# Patient Record
Sex: FEMALE | Race: WHITE | Employment: FULL TIME | URBAN - METROPOLITAN AREA
[De-identification: names, ages, dates, MRNs, and addresses within clinical notes are randomized per-mention and may not be internally consistent; named-entity substitution may affect disease eponyms.]

---

## 2017-07-10 PROBLEM — I10 ESSENTIAL HYPERTENSION: Status: ACTIVE | Noted: 2017-07-10

## 2017-12-19 ENCOUNTER — HOSPITAL ENCOUNTER (OUTPATIENT)
Dept: MAMMOGRAPHY | Age: 52
Discharge: HOME | End: 2017-12-19
Attending: OBSTETRICS & GYNECOLOGY
Payer: COMMERCIAL

## 2017-12-19 DIAGNOSIS — Z12.31 VISIT FOR SCREENING MAMMOGRAM: ICD-10-CM

## 2017-12-19 PROCEDURE — 77067 SCR MAMMO BI INCL CAD: CPT

## 2018-01-15 PROBLEM — F33.9 RECURRENT DEPRESSION (HCC): Status: ACTIVE | Noted: 2018-01-15

## 2018-07-11 PROBLEM — N95.2 ATROPHIC VAGINITIS: Status: ACTIVE | Noted: 2018-07-11

## 2018-07-30 PROBLEM — E66.01 OBESITY, MORBID (HCC): Status: ACTIVE | Noted: 2018-07-30

## 2018-12-20 ENCOUNTER — HOSPITAL ENCOUNTER (OUTPATIENT)
Dept: MAMMOGRAPHY | Age: 53
Discharge: HOME | End: 2018-12-20
Attending: OBSTETRICS & GYNECOLOGY
Payer: COMMERCIAL

## 2018-12-20 DIAGNOSIS — Z12.31 VISIT FOR SCREENING MAMMOGRAM: ICD-10-CM

## 2018-12-20 PROCEDURE — 77067 SCR MAMMO BI INCL CAD: CPT

## 2019-12-23 ENCOUNTER — HOSPITAL ENCOUNTER (OUTPATIENT)
Dept: MAMMOGRAPHY | Age: 54
Discharge: HOME | End: 2019-12-23
Attending: OBSTETRICS & GYNECOLOGY
Payer: COMMERCIAL

## 2019-12-23 DIAGNOSIS — Z12.31 VISIT FOR SCREENING MAMMOGRAM: ICD-10-CM

## 2019-12-23 PROCEDURE — 77063 BREAST TOMOSYNTHESIS BI: CPT

## 2020-10-07 ENCOUNTER — TRANSCRIBE ORDER (OUTPATIENT)
Dept: SCHEDULING | Age: 55
End: 2020-10-07

## 2020-10-07 DIAGNOSIS — Z12.31 SCREENING MAMMOGRAM FOR HIGH-RISK PATIENT: Primary | ICD-10-CM

## 2020-12-29 ENCOUNTER — HOSPITAL ENCOUNTER (OUTPATIENT)
Dept: MAMMOGRAPHY | Age: 55
Discharge: HOME | End: 2020-12-29
Attending: OBSTETRICS & GYNECOLOGY
Payer: COMMERCIAL

## 2020-12-29 DIAGNOSIS — Z12.31 SCREENING MAMMOGRAM FOR HIGH-RISK PATIENT: ICD-10-CM

## 2020-12-29 PROCEDURE — 77063 BREAST TOMOSYNTHESIS BI: CPT

## 2021-08-03 PROBLEM — I10 HYPERTENSION: Status: RESOLVED | Noted: 2021-08-03 | Resolved: 2021-08-03

## 2022-01-11 ENCOUNTER — HOSPITAL ENCOUNTER (OUTPATIENT)
Dept: MAMMOGRAPHY | Age: 57
Discharge: HOME | End: 2022-01-11
Attending: OBSTETRICS & GYNECOLOGY
Payer: COMMERCIAL

## 2022-01-11 ENCOUNTER — HOSPITAL ENCOUNTER (OUTPATIENT)
Dept: MAMMOGRAPHY | Age: 57
Discharge: HOME | End: 2022-01-11
Attending: FAMILY MEDICINE
Payer: COMMERCIAL

## 2022-01-11 DIAGNOSIS — E28.39 ESTROGEN DEFICIENCY: ICD-10-CM

## 2022-01-11 DIAGNOSIS — Z12.31 SCREENING MAMMOGRAM, ENCOUNTER FOR: ICD-10-CM

## 2022-01-11 PROCEDURE — 77080 DXA BONE DENSITY AXIAL: CPT

## 2022-01-11 PROCEDURE — 77063 BREAST TOMOSYNTHESIS BI: CPT

## 2022-03-19 PROBLEM — F33.9 RECURRENT DEPRESSION (HCC): Status: ACTIVE | Noted: 2018-01-15

## 2022-03-19 PROBLEM — N95.2 ATROPHIC VAGINITIS: Status: ACTIVE | Noted: 2018-07-11

## 2022-03-19 PROBLEM — E66.01 OBESITY, MORBID (HCC): Status: ACTIVE | Noted: 2018-07-30

## 2022-03-20 PROBLEM — I10 ESSENTIAL HYPERTENSION: Status: ACTIVE | Noted: 2017-07-10

## 2022-06-07 ENCOUNTER — OFFICE VISIT (OUTPATIENT)
Dept: GYNECOLOGY | Age: 57
End: 2022-06-07
Payer: COMMERCIAL

## 2022-06-07 VITALS
HEIGHT: 63 IN | WEIGHT: 230 LBS | BODY MASS INDEX: 40.75 KG/M2 | DIASTOLIC BLOOD PRESSURE: 98 MMHG | SYSTOLIC BLOOD PRESSURE: 150 MMHG

## 2022-06-07 DIAGNOSIS — R53.83 OTHER FATIGUE: Primary | ICD-10-CM

## 2022-06-07 DIAGNOSIS — F41.8 ANXIETY WITH DEPRESSION: ICD-10-CM

## 2022-06-07 PROCEDURE — 99214 OFFICE O/P EST MOD 30 MIN: CPT | Performed by: OBSTETRICS & GYNECOLOGY

## 2022-06-07 NOTE — PROGRESS NOTES
MEREDITH Mccloud is a 61 y.o. female seen for fatigue. She is not having any hot flashes or night sweats. She is having some mild issues with libido. Today the patient states that she has been experiencing increasing anxiety with depression she lost her father-in-law earlier this year and her best friend recently. She has loss of interest in activities and not really interested in leaving the house the weekends like she usually does. She been taking 20 of Prozac. She states that she is taking 2 tablets at a time before but it does not seem to help. I will change her Zoloft 50 mg a day and after 2 weeks she can increase this to 1-1/2 to 2 tablets a day with plans to follow her up in 3 weeks. Past Medical History, Past Surgical History, Family history, Social History, and Medications were all reviewed with the patient today and updated as necessary. Current Outpatient Medications   Medication Sig    sertraline (ZOLOFT) 50 MG tablet Take 1 tablet by mouth daily    Multiple Vitamin (MULTIVITAMIN PO) Take by mouth    conjugated estrogens (PREMARIN) 0.625 MG/GM vaginal cream Place 0.5 g vaginally Twice a Week    lisinopril (PRINIVIL;ZESTRIL) 10 MG tablet TAKE 1 TABLET BY MOUTH EVERY DAY    meloxicam (MOBIC) 7.5 MG tablet TAKE 1 TABLET BY MOUTH EVERY DAY    oxybutynin (DITROPAN-XL) 10 MG extended release tablet Take 10 mg by mouth daily     No current facility-administered medications for this visit.      Allergies   Allergen Reactions    Latex Rash    Adhesive Tape Hives    Other Hives    Sulfa Antibiotics Hives     Past Medical History:   Diagnosis Date    SUNNY III (cervical intraepithelial neoplasia grade III) with severe dysplasia     Depression     Hyperlipidemia     Hypertension     Overactive bladder     Swelling of left lower extremity      Past Surgical History:   Procedure Laterality Date    CHOLECYSTECTOMY      COLONOSCOPY      due 2022    LEE  1993     Family History Problem Relation Age of Onset    Colon Cancer Neg Hx     Thyroid Cancer Maternal Grandmother     Breast Cancer Maternal Aunt 36        Great Aunt    Heart Disease Mother         leaky valve    Ovarian Cancer Neg Hx     Hypertension Mother       Social History     Tobacco Use    Smoking status: Never Smoker    Smokeless tobacco: Never Used   Substance Use Topics    Alcohol use: Yes       Social History     Substance and Sexual Activity   Sexual Activity Not Currently    Partners: Male     OB History    Para Term  AB Living   0 0 0 0 0 0   SAB IAB Ectopic Molar Multiple Live Births   0 0 0 0 0 0       Health Maintenance  Mammogram: 2022  Colonoscopy:   Bone Density:    ROS:    Review of Systems  General: Not Present- Chills, Fever, Fatigue, Insomnia, Hot flashes/Night sweats, Weight gain  Skin: Not Present- Bruising, Change in Wart/Mole, Excessive Sweating, Itching, Nail Changes, New Lesions, Rash, Skin Color Changes and Ulcer. HEENT: Not Present- Headache, Blurred Vision, Double Vision, Glaucoma, Visual Disturbances, Hearing Loss, Ringing in the Ears, Vertigo, Nose Bleed, Bleeding Gums, Hoarseness and Sore Throat. Neck: Not Present- Neck Pain and Neck Swelling. Respiratory: Not Present- Cough, Difficulty Breathing and Difficulty Breathing on Exertion. Breast: Not Present- Breast Mass, Breast Pain, Breast Swelling, Nipple Discharge, Nipple Pain, Recent Breast Size Changes and Skin Changes. Cardiovascular: Not Present- Abnormal Blood Pressure, Chest Pain, Edema, Fainting / Blacking Out, Palpitations, Shortness of Breath and Swelling of Extremities. Gastrointestinal: Not Present- Abdominal Pain, Abdominal Swelling, Bloating, Change in Bowel Habits, Constipation, Diarrhea, Difficulty Swallowing, Gets full quickly at meals, Nausea, Rectal Bleeding and Vomiting.   Female Genitourinary: Not Present- Dysmenorrhea, Dyspareunia, Decreased libido, Excessive Menstrual Bleeding, Menstrual Irregularities, Pelvic Pain, Urinary Complaints, Vaginal Discharge, Vaginal itching/burning, Vaginal odor  Musculoskeletal: Not Present- Joint Pain and Muscle Pain. Neurological: Not Present- Dizziness, Fainting, Headaches and Seizures. Psychiatric: Not Present- Anxiety, Depression, Mood changes and Panic Attacks. Endocrine: Not Present- Appetite Changes, Cold Intolerance, Excessive Thirst, Excessive Urination and Heat Intolerance. Hematology: Not Present- Abnormal Bleeding, Easy Bruising and Enlarged Lymph Nodes. PHYSICAL EXAM:    BP (!) 150/98 (Position: Sitting)   Ht 5' 3\" (1.6 m)   Wt 230 lb (104.3 kg)   BMI 40.74 kg/m²           Medical problems and test results were reviewed with the patient today. ASSESSMENT and Yayo Lea was seen today for menopause. Diagnoses and all orders for this visit:    Other fatigue    Anxiety with depression  -     sertraline (ZOLOFT) 50 MG tablet; Take 1 tablet by mouth daily              Time:  I spent  30 minutes in preparing to see patient (including chart review and preparation), obtaining and/or reviewing additional medical history, performing a physical exam and evaluation, documenting clinical information in the electronic health record, independently interpreting results, communicating results to patient, family or caregiver, and/or coordinating care. No follow-up provider specified.         Aminata Roldan MD

## 2022-06-28 ENCOUNTER — OFFICE VISIT (OUTPATIENT)
Dept: GYNECOLOGY | Age: 57
End: 2022-06-28
Payer: COMMERCIAL

## 2022-06-28 VITALS
HEIGHT: 63 IN | SYSTOLIC BLOOD PRESSURE: 120 MMHG | BODY MASS INDEX: 40.93 KG/M2 | WEIGHT: 231 LBS | DIASTOLIC BLOOD PRESSURE: 80 MMHG

## 2022-06-28 DIAGNOSIS — R53.83 OTHER FATIGUE: ICD-10-CM

## 2022-06-28 DIAGNOSIS — F41.8 ANXIETY WITH DEPRESSION: Primary | ICD-10-CM

## 2022-06-28 PROCEDURE — 99213 OFFICE O/P EST LOW 20 MIN: CPT | Performed by: OBSTETRICS & GYNECOLOGY

## 2022-06-28 NOTE — PROGRESS NOTES
MEREDITH Bradford is a 61 y.o. female seen for follow up anxiety/depression. Pt was started on Zoloft 50 mg at last visit and her Prozac was discontinued. Pt state she does feel better on the Zoloft and is currently still taking just 50 mg. Past Medical History, Past Surgical History, Family history, Social History, and Medications were all reviewed with the patient today and updated as necessary. Current Outpatient Medications   Medication Sig    sertraline (ZOLOFT) 50 MG tablet Take 1 tablet by mouth daily    Multiple Vitamin (MULTIVITAMIN PO) Take by mouth    conjugated estrogens (PREMARIN) 0.625 MG/GM vaginal cream Place 0.5 g vaginally Twice a Week    lisinopril (PRINIVIL;ZESTRIL) 10 MG tablet TAKE 1 TABLET BY MOUTH EVERY DAY    meloxicam (MOBIC) 7.5 MG tablet TAKE 1 TABLET BY MOUTH EVERY DAY    oxybutynin (DITROPAN-XL) 10 MG extended release tablet Take 10 mg by mouth daily     No current facility-administered medications for this visit.      Allergies   Allergen Reactions    Latex Rash    Adhesive Tape Hives    Other Hives    Sulfa Antibiotics Hives     Past Medical History:   Diagnosis Date    SUNNY III (cervical intraepithelial neoplasia grade III) with severe dysplasia     Depression     Hyperlipidemia     Hypertension     Overactive bladder     Swelling of left lower extremity      Past Surgical History:   Procedure Laterality Date    CHOLECYSTECTOMY      COLONOSCOPY      due 2022    [facility]  1993     Family History   Problem Relation Age of Onset    Colon Cancer Neg Hx     Thyroid Cancer Maternal Grandmother     Breast Cancer Maternal Aunt 36        Great Aunt    Heart Disease Mother         leaky valve    Ovarian Cancer Neg Hx     Hypertension Mother       Social History     Tobacco Use    Smoking status: Never Smoker    Smokeless tobacco: Never Used   Substance Use Topics    Alcohol use: Yes     Comment: occ       Social History     Substance and Sexual Activity Sexual Activity Not Currently    Partners: Male     OB History    Para Term  AB Living   0 0 0 0 0 0   SAB IAB Ectopic Molar Multiple Live Births   0 0 0 0 0 0       Health Maintenance  Mammogram:   Colonoscopy:   Bone Density:    ROS:    Review of Systems  General: Not Present- Chills, Fever, Fatigue, Insomnia, Hot flashes/Night sweats, Weight gain  Skin: Not Present- Bruising, Change in Wart/Mole, Excessive Sweating, Itching, Nail Changes, New Lesions, Rash, Skin Color Changes and Ulcer. HEENT: Not Present- Headache, Blurred Vision, Double Vision, Glaucoma, Visual Disturbances, Hearing Loss, Ringing in the Ears, Vertigo, Nose Bleed, Bleeding Gums, Hoarseness and Sore Throat. Neck: Not Present- Neck Pain and Neck Swelling. Respiratory: Not Present- Cough, Difficulty Breathing and Difficulty Breathing on Exertion. Breast: Not Present- Breast Mass, Breast Pain, Breast Swelling, Nipple Discharge, Nipple Pain, Recent Breast Size Changes and Skin Changes. Cardiovascular: Not Present- Abnormal Blood Pressure, Chest Pain, Edema, Fainting / Blacking Out, Palpitations, Shortness of Breath and Swelling of Extremities. Gastrointestinal: Not Present- Abdominal Pain, Abdominal Swelling, Bloating, Change in Bowel Habits, Constipation, Diarrhea, Difficulty Swallowing, Gets full quickly at meals, Nausea, Rectal Bleeding and Vomiting. Female Genitourinary: Not Present- Dysmenorrhea, Dyspareunia, Decreased libido, Excessive Menstrual Bleeding, Menstrual Irregularities, Pelvic Pain, Urinary Complaints, Vaginal Discharge, Vaginal itching/burning, Vaginal odor  Musculoskeletal: Not Present- Joint Pain and Muscle Pain. Neurological: Not Present- Dizziness, Fainting, Headaches and Seizures. Psychiatric: Not Present- Anxiety, Depression, Mood changes and Panic Attacks. Endocrine: Not Present- Appetite Changes, Cold Intolerance, Excessive Thirst, Excessive Urination and Heat Intolerance.   Hematology: Not Present- Abnormal Bleeding, Easy Bruising and Enlarged Lymph Nodes. PHYSICAL EXAM:    /80   Ht 5' 3\" (1.6 m)   Wt 231 lb (104.8 kg)   BMI 40.92 kg/m²           Medical problems and test results were reviewed with the patient today. ASSESSMENT and PLAN    1. Anxiety with depression  2. Other fatigue             Time:  I spent  20 minutes in preparing to see patient (including chart review and preparation), obtaining and/or reviewing additional medical history, performing a physical exam and evaluation, documenting clinical information in the electronic health record, independently interpreting results, communicating results to patient, family or caregiver, and/or coordinating care. No follow-ups on file.        Jose Antonio Carter MD

## 2022-08-15 ENCOUNTER — OFFICE VISIT (OUTPATIENT)
Dept: GYNECOLOGY | Age: 57
End: 2022-08-15
Payer: COMMERCIAL

## 2022-08-15 VITALS
SYSTOLIC BLOOD PRESSURE: 124 MMHG | DIASTOLIC BLOOD PRESSURE: 84 MMHG | BODY MASS INDEX: 39.51 KG/M2 | HEIGHT: 63 IN | WEIGHT: 223 LBS

## 2022-08-15 DIAGNOSIS — Z12.31 SCREENING MAMMOGRAM, ENCOUNTER FOR: ICD-10-CM

## 2022-08-15 DIAGNOSIS — Z12.4 CERVICAL CANCER SCREENING: ICD-10-CM

## 2022-08-15 DIAGNOSIS — F41.8 ANXIETY WITH DEPRESSION: ICD-10-CM

## 2022-08-15 DIAGNOSIS — Z01.419 ENCOUNTER FOR WELL WOMAN EXAM WITH ROUTINE GYNECOLOGICAL EXAM: Primary | ICD-10-CM

## 2022-08-15 DIAGNOSIS — N95.2 VAGINAL ATROPHY: ICD-10-CM

## 2022-08-15 PROCEDURE — 99396 PREV VISIT EST AGE 40-64: CPT | Performed by: OBSTETRICS & GYNECOLOGY

## 2022-08-15 RX ORDER — CONJUGATED ESTROGENS 0.62 MG/G
0.5 CREAM VAGINAL
Qty: 30 G | Refills: 5 | Status: SHIPPED | OUTPATIENT
Start: 2022-08-15

## 2022-08-15 NOTE — PROGRESS NOTES
MEREDITH Billy is a 61 y.o. female seen for annual GYN exam.  Her anxiety is much better after changing to Zoloft and she wishes to continue this. Past Medical History, Past Surgical History, Family history, Social History, and Medications were all reviewed with the patient today and updated as necessary. Current Outpatient Medications   Medication Sig    sertraline (ZOLOFT) 50 MG tablet Take 1 tablet by mouth in the morning. conjugated estrogens (PREMARIN) 0.625 MG/GM vaginal cream Place 0.5 g vaginally Twice a Week    Multiple Vitamin (MULTIVITAMIN PO) Take by mouth    lisinopril (PRINIVIL;ZESTRIL) 10 MG tablet TAKE 1 TABLET BY MOUTH EVERY DAY    meloxicam (MOBIC) 7.5 MG tablet TAKE 1 TABLET BY MOUTH EVERY DAY    oxybutynin (DITROPAN-XL) 10 MG extended release tablet Take 10 mg by mouth daily     No current facility-administered medications for this visit.      Allergies   Allergen Reactions    Latex Rash    Adhesive Tape Hives    Other Hives    Sulfa Antibiotics Hives     Past Medical History:   Diagnosis Date    SUNNY III (cervical intraepithelial neoplasia grade III) with severe dysplasia     Depression     Hyperlipidemia     Hypertension     Overactive bladder     Swelling of left lower extremity      Past Surgical History:   Procedure Laterality Date    CHOLECYSTECTOMY      COLONOSCOPY      due     [facility]       Family History   Problem Relation Age of Onset    Colon Cancer Neg Hx     Thyroid Cancer Maternal Grandmother     Breast Cancer Maternal Aunt 36        Great Aunt    Heart Disease Mother         leaky valve    Ovarian Cancer Neg Hx     Hypertension Mother       Social History     Tobacco Use    Smoking status: Never    Smokeless tobacco: Never   Substance Use Topics    Alcohol use: Yes     Comment: occ       Social History     Substance and Sexual Activity   Sexual Activity Not Currently    Partners: Male     OB History    Para Term  AB Living   0 0 0 0 0 0   SAB 124/84 (Position: Sitting)   Ht 5' 3\" (1.6 m)   Wt 223 lb (101.2 kg)   BMI 39.50 kg/m²     Physical Exam   General   Mental Status - Alert. General Appearance - Cooperative. Integumentary   General Characteristics: Overall examination of the patient's skin reveals - no rashes and no suspicious lesions. Head and Neck  Head - normocephalic, atraumatic with no lesions or palpable masses. Neck Note: Normal   Thyroid   Gland Characteristics - normal size and consistency and no palpable nodules. Chest and Lung Exam   Chest and lung exam reveals - on auscultation, normal breath sounds, no adventitious sounds and normal vocal resonance. Breast   Breast - Left - Normal. Right - Normal.     Cardiovascular   Cardiovascular examination reveals - normal heart sounds, regular rate and rhythm with no murmurs. Abdomen   Inspection: - Inspection Normal.   Palpation/Percussion: Palpation and Percussion of the abdomen reveal - Non Tender, No Rebound tenderness, No Rigidity (guarding), No hepatosplenomegaly, No Palpable abdominal masses and Soft. Auscultation: Auscultation of the abdomen reveals - Bowel sounds normal.     Female Genitourinary     External Genitalia   Vulva: - Normal. Perineum - Normal. Bartholin's Gland - Bilateral - Normal. Clitoris - Normal.   Introitus: Characteristics - Normal.   Urethra: Characteristics - Normal.     Speculum & Bimanual   Vagina: Vaginal Mucosa - Normal.   Vaginal Wall: - Normal.   Vaginal Lesions - None. Cervix: Characteristics - Normal.   Uterus: Characteristics - Normal.   Adnexa: - Normal.   Bladder - Normal.     Peripheral Vascular   Normal    Neuropsychiatric   Examination of related systems reveals - The patient is well-nourished and well-groomed. Mental status exam performed with findings of - Oriented X3 with appropriate mood and affect.      Musculoskeletal  Normal      General Lymphatics  Normal           Medical problems and test results were reviewed with the patient today. ASSESSMENT and PLAN    1. Encounter for well woman exam with routine gynecological exam  2. Cervical cancer screening  -     PAP LB, Reflex HPV ASCUS  3. Screening mammogram, encounter for  -     [facility] MISHA DIGITAL SCREEN BILATERAL; Future  4. Anxiety with depression  -     sertraline (ZOLOFT) 50 MG tablet; Take 1 tablet by mouth in the morning., Disp-90 tablet, R-4Normal  5. Vaginal atrophy  -     conjugated estrogens (PREMARIN) 0.625 MG/GM vaginal cream; Place 0.5 g vaginally Twice a Week, Vaginal, TWICE WEEKLY Starting Mon 8/15/2022, Disp-30 g, R-5, Normal           No follow-ups on file.        Taniya Richmond MD  8/15/2022

## 2022-08-17 LAB
CYTOLOGIST CVX/VAG CYTO: NORMAL
CYTOLOGY CVX/VAG DOC THIN PREP: NORMAL
HPV REFLEX: NORMAL
Lab: NORMAL
PATH REPORT.FINAL DX SPEC: NORMAL
STAT OF ADQ CVX/VAG CYTO-IMP: NORMAL

## 2022-09-26 ENCOUNTER — TELEMEDICINE (OUTPATIENT)
Dept: FAMILY MEDICINE CLINIC | Facility: CLINIC | Age: 57
End: 2022-09-26
Payer: COMMERCIAL

## 2022-09-26 DIAGNOSIS — R09.89 CHEST CONGESTION: ICD-10-CM

## 2022-09-26 DIAGNOSIS — J02.9 SORE THROAT: ICD-10-CM

## 2022-09-26 DIAGNOSIS — U07.1 LAB TEST POSITIVE FOR DETECTION OF COVID-19 VIRUS: Primary | ICD-10-CM

## 2022-09-26 PROCEDURE — 99443 PR PHYS/QHP TELEPHONE EVALUATION 21-30 MIN: CPT | Performed by: FAMILY MEDICINE

## 2022-09-26 ASSESSMENT — PATIENT HEALTH QUESTIONNAIRE - PHQ9: DEPRESSION UNABLE TO ASSESS: FUNCTIONAL CAPACITY MOTIVATION LIMITS ACCURACY

## 2022-09-26 NOTE — PROGRESS NOTES
SUBJECTIVE:   Azalia Halsted is a 61 y.o. female who has a past medical history significant for hypertension, high cholesterol, high triglycerides, recurrent and morbid obesity. Patient presents today for virtual visit via telephone encounter. She reports that she began to have developed symptoms on Saturday of sore throat and chest congestion. This progressively worsened to the point where yesterday she did a COVID test and it was positive. She reports no chest pain or shortness of breath. She reports no body aches. Ironically she states she is feeling some better today. Patient reports no fever. She is COVID vaccinated. Patient's vital signs were not performed as encounter was performed virtually. Location of virtual visit was patient's home. HPI  See above    Past Medical History, Past Surgical History, Family history, Social History, and Medications were all reviewed with the patient today and updated as necessary. Current Outpatient Medications   Medication Sig Dispense Refill    sertraline (ZOLOFT) 50 MG tablet Take 1 tablet by mouth in the morning. 90 tablet 4    conjugated estrogens (PREMARIN) 0.625 MG/GM vaginal cream Place 0.5 g vaginally Twice a Week 30 g 5    Multiple Vitamin (MULTIVITAMIN PO) Take by mouth      lisinopril (PRINIVIL;ZESTRIL) 10 MG tablet TAKE 1 TABLET BY MOUTH EVERY DAY      meloxicam (MOBIC) 7.5 MG tablet TAKE 1 TABLET BY MOUTH EVERY DAY      oxybutynin (DITROPAN-XL) 10 MG extended release tablet Take 10 mg by mouth daily       No current facility-administered medications for this visit.      Allergies   Allergen Reactions    Latex Rash    Adhesive Tape Hives    Other Hives    Sulfa Antibiotics Hives     Patient Active Problem List   Diagnosis    HPV in female    Overactive bladder    SUNNY III (cervical intraepithelial neoplasia grade III) with severe dysplasia    Obesity, morbid (HCC)    Recurrent depression (HCC)    Atrophic vaginitis    Essential hypertension    Hyperlipidemia    Anxiety with depression     Past Medical History:   Diagnosis Date    SUNNY III (cervical intraepithelial neoplasia grade III) with severe dysplasia     Depression     Hyperlipidemia     Hypertension     Overactive bladder     Swelling of left lower extremity      Past Surgical History:   Procedure Laterality Date    CHOLECYSTECTOMY      COLONOSCOPY      due 2022    [facility]  1993     Family History   Problem Relation Age of Onset    Colon Cancer Neg Hx     Thyroid Cancer Maternal Grandmother     Breast Cancer Maternal Aunt 36        Great Aunt    Heart Disease Mother         leaky valve    Ovarian Cancer Neg Hx     Hypertension Mother      Social History     Tobacco Use    Smoking status: Never    Smokeless tobacco: Never   Substance Use Topics    Alcohol use: Yes     Comment: occ         Review of Systems  See above    OBJECTIVE:  There were no vitals taken for this visit. Physical Exam    Medical problems and test results were reviewed with the patient today. ASSESSMENT and PLAN    1. Positive COVID. Discussed with the patient the risk and benefits of antiviral therapy. She is morbidly obese and over the age of 47 and therefore would potentially benefit from antiviral therapy. However she elects to observe and states that if her symptoms do not continue to improve and certainly if they worsen she will let me know. She understands that she has a narrow window of opportunity and that Wednesday would be her cut off date for being treated within the 5 days of onset of symptoms. She is instructed to monitor for worsening symptoms and what to do in the event that they occur. She is also instructed on the CDC's guidelines for quarantine. 2.  Chest congestion. Over-the-counter plain Mucinex and Delsym as needed for cough. 3.  Sore throat. Analgesics as needed. Consent:   This patient and/or their healthcare decision maker is aware that this patient-initiated Telehealth encounter is a billable service, with coverage as determined by their insurance carrier. Patient is aware that they may receive a bill and has provided verbal consent to proceed: Yes    I was in the officewhile conducting this encounter. Patient was at home. This virtual visit was conducted telephone encounter only. -  I affirm this is a Patient Initiated Episode with an Established Patient who has not had a related appointment within my department in the past 7 days or scheduled within the next 24 hours. Note: this encounter is not billable if this call serves to triage the patient into an appointment for the relevant concern. On this date 9/26/2022 I have spent 21 minutes reviewing previous notes, test results and face to face with the patient discussing the diagnosis and importance of compliance with the treatment plan as well as documenting on the day of the visit. Greater than 50% of this visit was spent counseling the patient about test results, prognosis, importance of compliance, education about disease process, benefits of medications, instructions for management of acute symptoms, and follow up plans.

## 2022-09-27 RX ORDER — LISINOPRIL 10 MG/1
TABLET ORAL
Qty: 90 TABLET | Refills: 3 | Status: SHIPPED | OUTPATIENT
Start: 2022-09-27

## 2022-09-28 RX ORDER — NIRMATRELVIR AND RITONAVIR 300-100 MG
KIT ORAL
Qty: 30 TABLET | Refills: 0 | Status: SHIPPED | OUTPATIENT
Start: 2022-09-28 | End: 2022-10-03

## 2023-01-23 ENCOUNTER — HOSPITAL ENCOUNTER (OUTPATIENT)
Dept: MAMMOGRAPHY | Age: 58
Discharge: HOME | End: 2023-01-26
Payer: COMMERCIAL

## 2023-01-23 DIAGNOSIS — Z12.31 SCREENING MAMMOGRAM, ENCOUNTER FOR: ICD-10-CM

## 2023-01-23 PROCEDURE — 77063 BREAST TOMOSYNTHESIS BI: CPT

## 2023-02-28 ENCOUNTER — NURSE ONLY (OUTPATIENT)
Dept: FAMILY MEDICINE CLINIC | Facility: CLINIC | Age: 58
End: 2023-02-28
Payer: COMMERCIAL

## 2023-02-28 DIAGNOSIS — I10 ESSENTIAL (PRIMARY) HYPERTENSION: ICD-10-CM

## 2023-02-28 DIAGNOSIS — E78.00 PURE HYPERCHOLESTEROLEMIA, UNSPECIFIED: ICD-10-CM

## 2023-02-28 DIAGNOSIS — Z00.00 LABORATORY EXAMINATION ORDERED AS PART OF A ROUTINE GENERAL MEDICAL EXAMINATION: Primary | ICD-10-CM

## 2023-02-28 LAB
ALBUMIN SERPL-MCNC: 3.7 G/DL (ref 3.5–5)
ALBUMIN/GLOB SERPL: 1.2 (ref 0.4–1.6)
ALP SERPL-CCNC: 72 U/L (ref 50–136)
ALT SERPL-CCNC: 21 U/L (ref 12–65)
ANION GAP SERPL CALC-SCNC: 3 MMOL/L (ref 2–11)
AST SERPL-CCNC: 16 U/L (ref 15–37)
BILIRUB SERPL-MCNC: 0.5 MG/DL (ref 0.2–1.1)
BUN SERPL-MCNC: 20 MG/DL (ref 6–23)
CALCIUM SERPL-MCNC: 9.1 MG/DL (ref 8.3–10.4)
CHLORIDE SERPL-SCNC: 104 MMOL/L (ref 101–110)
CHOLEST SERPL-MCNC: 218 MG/DL
CO2 SERPL-SCNC: 28 MMOL/L (ref 21–32)
CREAT SERPL-MCNC: 0.7 MG/DL (ref 0.6–1)
GLOBULIN SER CALC-MCNC: 3.1 G/DL (ref 2.8–4.5)
GLUCOSE SERPL-MCNC: 96 MG/DL (ref 65–100)
GRANS ABSOLUTE, POC: 2.6 K/UL
GRANULOCYTES %, POC: 53.5 %
HDLC SERPL-MCNC: 67 MG/DL (ref 40–60)
HDLC SERPL: 3.3
HEMATOCRIT, POC: 40.9 %
HEMOGLOBIN, POC: 13.1 G/DL
LDLC SERPL CALC-MCNC: 118.6 MG/DL
LYMPHOCYTE %, POC: 38 %
LYMPHS ABSOLUTE, POC: 1.9 K/UL
MCH, POC: 30.4 PG (ref 40–?)
MCHC, POC: 32
MCV, POC: 94.9
MONOCYTE %, POC: 8.5 %
MONOCYTE, ABSOLUTE POC: 0.4 K/UL
MPV, POC: 7.7 FL
PLATELET COUNT, POC: 323 K/UL
POTASSIUM SERPL-SCNC: 4.3 MMOL/L (ref 3.5–5.1)
PROT SERPL-MCNC: 6.8 G/DL (ref 6.3–8.2)
RBC, POC: 4.31 M/UL
RDW, POC: 11.5 %
SODIUM SERPL-SCNC: 135 MMOL/L (ref 133–143)
TRIGL SERPL-MCNC: 162 MG/DL (ref 35–150)
TSH, 3RD GENERATION: 1.14 UIU/ML (ref 0.36–3.74)
VLDLC SERPL CALC-MCNC: 32.4 MG/DL (ref 6–23)
WBC, POC: 4.9 K/UL

## 2023-02-28 PROCEDURE — 85025 COMPLETE CBC W/AUTO DIFF WBC: CPT | Performed by: FAMILY MEDICINE

## 2023-03-07 ENCOUNTER — OFFICE VISIT (OUTPATIENT)
Dept: FAMILY MEDICINE CLINIC | Facility: CLINIC | Age: 58
End: 2023-03-07
Payer: COMMERCIAL

## 2023-03-07 VITALS
DIASTOLIC BLOOD PRESSURE: 84 MMHG | HEIGHT: 63 IN | BODY MASS INDEX: 41.39 KG/M2 | WEIGHT: 233.6 LBS | SYSTOLIC BLOOD PRESSURE: 128 MMHG

## 2023-03-07 DIAGNOSIS — E78.00 PURE HYPERCHOLESTEROLEMIA: ICD-10-CM

## 2023-03-07 DIAGNOSIS — Z00.00 ROUTINE GENERAL MEDICAL EXAMINATION AT A HEALTH CARE FACILITY: Primary | ICD-10-CM

## 2023-03-07 DIAGNOSIS — F33.9 RECURRENT MAJOR DEPRESSIVE DISORDER, REMISSION STATUS UNSPECIFIED (HCC): ICD-10-CM

## 2023-03-07 DIAGNOSIS — I10 PRIMARY HYPERTENSION: ICD-10-CM

## 2023-03-07 DIAGNOSIS — E66.01 OBESITY, CLASS III, BMI 40-49.9 (MORBID OBESITY) (HCC): ICD-10-CM

## 2023-03-07 DIAGNOSIS — E78.1 PURE HYPERGLYCERIDEMIA: ICD-10-CM

## 2023-03-07 PROCEDURE — 99396 PREV VISIT EST AGE 40-64: CPT | Performed by: FAMILY MEDICINE

## 2023-03-07 PROCEDURE — 3079F DIAST BP 80-89 MM HG: CPT | Performed by: FAMILY MEDICINE

## 2023-03-07 PROCEDURE — 3074F SYST BP LT 130 MM HG: CPT | Performed by: FAMILY MEDICINE

## 2023-03-07 SDOH — ECONOMIC STABILITY: FOOD INSECURITY: WITHIN THE PAST 12 MONTHS, YOU WORRIED THAT YOUR FOOD WOULD RUN OUT BEFORE YOU GOT MONEY TO BUY MORE.: NEVER TRUE

## 2023-03-07 SDOH — ECONOMIC STABILITY: INCOME INSECURITY: HOW HARD IS IT FOR YOU TO PAY FOR THE VERY BASICS LIKE FOOD, HOUSING, MEDICAL CARE, AND HEATING?: NOT HARD AT ALL

## 2023-03-07 SDOH — ECONOMIC STABILITY: HOUSING INSECURITY
IN THE LAST 12 MONTHS, WAS THERE A TIME WHEN YOU DID NOT HAVE A STEADY PLACE TO SLEEP OR SLEPT IN A SHELTER (INCLUDING NOW)?: NO

## 2023-03-07 SDOH — ECONOMIC STABILITY: FOOD INSECURITY: WITHIN THE PAST 12 MONTHS, THE FOOD YOU BOUGHT JUST DIDN'T LAST AND YOU DIDN'T HAVE MONEY TO GET MORE.: NEVER TRUE

## 2023-03-07 ASSESSMENT — PATIENT HEALTH QUESTIONNAIRE - PHQ9
SUM OF ALL RESPONSES TO PHQ QUESTIONS 1-9: 0
SUM OF ALL RESPONSES TO PHQ QUESTIONS 1-9: 0
2. FEELING DOWN, DEPRESSED OR HOPELESS: 0
SUM OF ALL RESPONSES TO PHQ9 QUESTIONS 1 & 2: 0
1. LITTLE INTEREST OR PLEASURE IN DOING THINGS: 0
SUM OF ALL RESPONSES TO PHQ QUESTIONS 1-9: 0
SUM OF ALL RESPONSES TO PHQ QUESTIONS 1-9: 0

## 2023-03-07 NOTE — PROGRESS NOTES
SUBJECTIVE:   Richard Landers is a 61 y.o. female who has a past medical history significant for hypertension, high cholesterol, high triglycerides, recurrent and morbid obesity. Patient presents today for CPX. Review of systems reveals no complaints of chest pain, shortness of breath, orthopnea or PND. GI and  review of systems is unremarkable. Current medications are listed in the EMR and reviewed today. HPI  See above    Past Medical History, Past Surgical History, Family history, Social History, and Medications were all reviewed with the patient today and updated as necessary. Current Outpatient Medications   Medication Sig Dispense Refill    lisinopril (PRINIVIL;ZESTRIL) 10 MG tablet TAKE 1 TABLET BY MOUTH EVERY DAY 90 tablet 3    sertraline (ZOLOFT) 50 MG tablet Take 1 tablet by mouth in the morning. 90 tablet 4    conjugated estrogens (PREMARIN) 0.625 MG/GM vaginal cream Place 0.5 g vaginally Twice a Week 30 g 5    Multiple Vitamin (MULTIVITAMIN PO) Take by mouth      meloxicam (MOBIC) 7.5 MG tablet TAKE 1 TABLET BY MOUTH EVERY DAY      oxybutynin (DITROPAN-XL) 10 MG extended release tablet Take 10 mg by mouth daily       No current facility-administered medications for this visit.      Allergies   Allergen Reactions    Latex Rash    Adhesive Tape Hives    Other Hives    Sulfa Antibiotics Hives     Patient Active Problem List   Diagnosis    HPV in female    Overactive bladder    SUNNY III (cervical intraepithelial neoplasia grade III) with severe dysplasia    Obesity, morbid (HCC)    Recurrent depression (HCC)    Atrophic vaginitis    Essential hypertension    Hyperlipidemia    Anxiety with depression     Past Medical History:   Diagnosis Date    SUNNY III (cervical intraepithelial neoplasia grade III) with severe dysplasia     Depression     Hyperlipidemia     Hypertension     Overactive bladder     Swelling of left lower extremity      Past Surgical History:   Procedure Laterality Date CHOLECYSTECTOMY      COLONOSCOPY      due 2022    [facility]  1993     Family History   Problem Relation Age of Onset    Colon Cancer Neg Hx     Thyroid Cancer Maternal Grandmother     Breast Cancer Maternal Aunt 36        Great Aunt    Heart Disease Mother         leaky valve    Ovarian Cancer Neg Hx     Hypertension Mother      Social History     Tobacco Use    Smoking status: Never    Smokeless tobacco: Never   Substance Use Topics    Alcohol use: Yes     Comment: occ         Review of Systems  See above    OBJECTIVE:  /84   Ht 5' 3\" (1.6 m)   Wt 233 lb 9.6 oz (106 kg)   BMI 41.38 kg/m²      Physical Exam  Constitutional:       General: She is not in acute distress. Appearance: Normal appearance. She is not ill-appearing. HENT:      Head: Normocephalic and atraumatic. Right Ear: Ear canal and external ear normal. There is no impacted cerumen. Left Ear: Tympanic membrane, ear canal and external ear normal. There is no impacted cerumen. Nose: Nose normal.      Mouth/Throat:      Mouth: Mucous membranes are moist.      Pharynx: Oropharynx is clear. No oropharyngeal exudate or posterior oropharyngeal erythema. Eyes:      General: No scleral icterus. Extraocular Movements: Extraocular movements intact. Conjunctiva/sclera: Conjunctivae normal.      Pupils: Pupils are equal, round, and reactive to light. Neck:      Vascular: No carotid bruit. Cardiovascular:      Rate and Rhythm: Normal rate and regular rhythm. Pulses: Normal pulses. Heart sounds: Normal heart sounds. No murmur heard. Pulmonary:      Effort: Pulmonary effort is normal. No respiratory distress. Breath sounds: Normal breath sounds. No wheezing. Abdominal:      General: Abdomen is flat. Bowel sounds are normal. There is no distension. Palpations: Abdomen is soft. There is no mass. Tenderness: There is no abdominal tenderness. Hernia: No hernia is present.    Musculoskeletal: General: No swelling, tenderness or deformity. Normal range of motion. Cervical back: Normal range of motion and neck supple. Right lower leg: No edema. Left lower leg: No edema. Lymphadenopathy:      Cervical: No cervical adenopathy. Skin:     General: Skin is warm. Findings: No rash. Neurological:      General: No focal deficit present. Mental Status: She is alert and oriented to person, place, and time. Cranial Nerves: No cranial nerve deficit. Motor: No weakness. Deep Tendon Reflexes: Reflexes normal.   Psychiatric:         Mood and Affect: Mood normal.         Behavior: Behavior normal.         Thought Content: Thought content normal.         Judgment: Judgment normal.       Medical problems and test results were reviewed with the patient today. ASSESSMENT and PLAN    1.  CPX. Anticipatory guidance discussed including the importance of sunscreen use, helmet use and seatbelt use. Refer for screening colonoscopy as she is due for this. Pap smear, mammography and bone density are up-to-date. 2.  Hypertension. BP is 128/84. Renal function and electrolytes are normal.    3.  High cholesterol. LDL is 118. Continue dietary focus. Previously 126.    4.  High triglycerides. Triglycerides are 162. Previously 152. Continue dietary focus. 5.  Recurrent depression. Continue Zoloft. No breakthrough complaints reported. 6.  Morbid obesity. BMI 41.3. Dietary focus. Elements of this note have been dictated using speech recognition software. As a result, errors of speech recognition may have occurred.

## 2023-08-08 RX ORDER — MELOXICAM 7.5 MG/1
7.5 TABLET ORAL DAILY
Qty: 90 TABLET | Refills: 1 | Status: SHIPPED | OUTPATIENT
Start: 2023-08-08

## 2023-08-11 NOTE — PROGRESS NOTES
HPI    Wendy Kennedy is a 61 y.o. female seen for annual GYN exam.  She will change her hormone replacement therapy from Premarin cream to Estrace cream.  She states that the Zoloft has helped tremendously with her anxiety and depression. Apparently there is increased risk of GI bleed with Mobic Zoloft. She will discuss this medicine combination with Dr. Krysta Jurado in the near future. Past Medical History, Past Surgical History, Family history, Social History, and Medications were all reviewed with the patient today and updated as necessary. Current Outpatient Medications   Medication Sig    sertraline (ZOLOFT) 50 MG tablet Take 1 tablet by mouth daily    estradiol (ESTRACE VAGINAL) 0.1 MG/GM vaginal cream Use 1/2 g vaginally 3 nights per week    meloxicam (MOBIC) 7.5 MG tablet Take 1 tablet by mouth daily    lisinopril (PRINIVIL;ZESTRIL) 10 MG tablet TAKE 1 TABLET BY MOUTH EVERY DAY    Multiple Vitamin (MULTIVITAMIN PO) Take by mouth    oxybutynin (DITROPAN-XL) 10 MG extended release tablet Take 10 mg by mouth daily (Patient not taking: Reported on 8/14/2023)     No current facility-administered medications for this visit.      Allergies   Allergen Reactions    Latex Rash    Adhesive Tape Hives    Other Hives    Sulfa Antibiotics Hives     Past Medical History:   Diagnosis Date    SUNNY III (cervical intraepithelial neoplasia grade III) with severe dysplasia     Depression     Hyperlipidemia     Hypertension     Overactive bladder     Swelling of left lower extremity      Past Surgical History:   Procedure Laterality Date    CHOLECYSTECTOMY      COLONOSCOPY      due 2022    [facility]  1993     Family History   Problem Relation Age of Onset    Colon Cancer Neg Hx     Thyroid Cancer Maternal Grandmother     Breast Cancer Maternal Aunt 36        Great Aunt    Heart Disease Mother         leaky valve    Ovarian Cancer Neg Hx     Hypertension Mother       Social History     Tobacco Use    Smoking status: Never

## 2023-08-14 ENCOUNTER — OFFICE VISIT (OUTPATIENT)
Dept: OBGYN CLINIC | Age: 58
End: 2023-08-14
Payer: COMMERCIAL

## 2023-08-14 VITALS
BODY MASS INDEX: 40.4 KG/M2 | DIASTOLIC BLOOD PRESSURE: 84 MMHG | SYSTOLIC BLOOD PRESSURE: 132 MMHG | WEIGHT: 228 LBS | HEIGHT: 63 IN

## 2023-08-14 DIAGNOSIS — F41.8 ANXIETY WITH DEPRESSION: ICD-10-CM

## 2023-08-14 DIAGNOSIS — Z12.4 SCREENING FOR CERVICAL CANCER: ICD-10-CM

## 2023-08-14 DIAGNOSIS — Z01.419 WELL WOMAN EXAM WITH ROUTINE GYNECOLOGICAL EXAM: Primary | ICD-10-CM

## 2023-08-14 DIAGNOSIS — Z11.51 SCREENING FOR HUMAN PAPILLOMAVIRUS (HPV): ICD-10-CM

## 2023-08-14 DIAGNOSIS — N95.2 VAGINAL ATROPHY: ICD-10-CM

## 2023-08-14 PROCEDURE — 99396 PREV VISIT EST AGE 40-64: CPT | Performed by: OBSTETRICS & GYNECOLOGY

## 2023-08-14 PROCEDURE — 3075F SYST BP GE 130 - 139MM HG: CPT | Performed by: OBSTETRICS & GYNECOLOGY

## 2023-08-14 PROCEDURE — 3079F DIAST BP 80-89 MM HG: CPT | Performed by: OBSTETRICS & GYNECOLOGY

## 2023-08-14 RX ORDER — ESTRADIOL 0.1 MG/G
CREAM VAGINAL
Qty: 42.5 G | Refills: 3 | Status: SHIPPED | OUTPATIENT
Start: 2023-08-14

## 2023-08-18 LAB
CYTOLOGIST CVX/VAG CYTO: NORMAL
CYTOLOGY CVX/VAG DOC THIN PREP: NORMAL
HPV APTIMA: NEGATIVE
Lab: NORMAL
PATH REPORT.FINAL DX SPEC: NORMAL
STAT OF ADQ CVX/VAG CYTO-IMP: NORMAL

## 2023-11-22 RX ORDER — LISINOPRIL 10 MG/1
TABLET ORAL
Qty: 90 TABLET | Refills: 1 | Status: SHIPPED | OUTPATIENT
Start: 2023-11-22

## 2023-12-26 ENCOUNTER — TELEPHONE (OUTPATIENT)
Dept: OBGYN CLINIC | Age: 58
End: 2023-12-26

## 2023-12-26 NOTE — TELEPHONE ENCOUNTER
Pt LVM requesting medication for poss UTI. Spoke with pt. Reports increased urinary frequency, dysuria, and possible hematuria today. Advised pt she will need to come in for visit. Advised pt she can go to [facility]PC[facility] or use [facility] e-visit before appt 12/28.

## 2023-12-27 ENCOUNTER — TRANSCRIBE ORDERS (OUTPATIENT)
Dept: SCHEDULING | Age: 58
End: 2023-12-27

## 2023-12-27 DIAGNOSIS — Z12.31 SCREENING MAMMOGRAM FOR HIGH-RISK PATIENT: Primary | ICD-10-CM

## 2024-02-02 RX ORDER — MELOXICAM 7.5 MG/1
7.5 TABLET ORAL DAILY
Qty: 90 TABLET | Refills: 0 | Status: SHIPPED | OUTPATIENT
Start: 2024-02-02

## 2024-02-27 ENCOUNTER — HOSPITAL ENCOUNTER (OUTPATIENT)
Dept: MAMMOGRAPHY | Age: 59
Discharge: HOME | End: 2024-03-01
Attending: OBSTETRICS & GYNECOLOGY
Payer: COMMERCIAL

## 2024-02-27 VITALS — HEIGHT: 64 IN | BODY MASS INDEX: 37.73 KG/M2 | WEIGHT: 221 LBS

## 2024-02-27 DIAGNOSIS — Z12.31 SCREENING MAMMOGRAM FOR HIGH-RISK PATIENT: ICD-10-CM

## 2024-02-27 PROCEDURE — 77063 BREAST TOMOSYNTHESIS BI: CPT

## 2024-03-05 ENCOUNTER — NURSE ONLY (OUTPATIENT)
Dept: FAMILY MEDICINE CLINIC | Facility: CLINIC | Age: 59
End: 2024-03-05
Payer: COMMERCIAL

## 2024-03-05 DIAGNOSIS — F41.8 ANXIETY WITH DEPRESSION: ICD-10-CM

## 2024-03-05 DIAGNOSIS — Z00.00 LABORATORY EXAM ORDERED AS PART OF ROUTINE GENERAL MEDICAL EXAMINATION: Primary | ICD-10-CM

## 2024-03-05 LAB
ALBUMIN SERPL-MCNC: 4 G/DL (ref 3.5–5)
ALBUMIN/GLOB SERPL: 1.4 (ref 0.4–1.6)
ALP SERPL-CCNC: 84 U/L (ref 50–136)
ALT SERPL-CCNC: 16 U/L (ref 12–65)
ANION GAP SERPL CALC-SCNC: 5 MMOL/L (ref 2–11)
AST SERPL-CCNC: 13 U/L (ref 15–37)
BILIRUB SERPL-MCNC: 0.5 MG/DL (ref 0.2–1.1)
BUN SERPL-MCNC: 24 MG/DL (ref 6–23)
CALCIUM SERPL-MCNC: 9.4 MG/DL (ref 8.3–10.4)
CHLORIDE SERPL-SCNC: 109 MMOL/L (ref 103–113)
CHOLEST SERPL-MCNC: 218 MG/DL
CO2 SERPL-SCNC: 28 MMOL/L (ref 21–32)
CREAT SERPL-MCNC: 0.7 MG/DL (ref 0.6–1)
GLOBULIN SER CALC-MCNC: 2.9 G/DL (ref 2.8–4.5)
GLUCOSE SERPL-MCNC: 96 MG/DL (ref 65–100)
GRANS ABSOLUTE, POC: 2.8 K/UL
GRANULOCYTES %, POC: 50.6 %
HDLC SERPL-MCNC: 67 MG/DL (ref 40–60)
HDLC SERPL: 3.3
HEMATOCRIT, POC: 41.7 %
HEMOGLOBIN, POC: 13.4 G/DL
LDLC SERPL CALC-MCNC: 130 MG/DL
LYMPHOCYTE %, POC: 39.6 %
LYMPHS ABSOLUTE, POC: 2.2 K/UL
MCH, POC: NORMAL PG (ref 40–?)
MCHC, POC: 32.1
MCV, POC: 91.5
MONOCYTE %, POC: 9.8 %
MONOCYTE, ABSOLUTE POC: 0.5 K/UL
MPV, POC: 7.8 FL
PLATELET COUNT, POC: 268 K/UL
POTASSIUM SERPL-SCNC: 4.3 MMOL/L (ref 3.5–5.1)
PROT SERPL-MCNC: 6.9 G/DL (ref 6.3–8.2)
RBC, POC: 4.56 M/UL
RDW, POC: 13.8 %
SODIUM SERPL-SCNC: 142 MMOL/L (ref 136–146)
TRIGL SERPL-MCNC: 105 MG/DL (ref 35–150)
TSH, 3RD GENERATION: 1.38 UIU/ML (ref 0.36–3.74)
VLDLC SERPL CALC-MCNC: 21 MG/DL (ref 6–23)
WBC, POC: 5.6 K/UL

## 2024-03-05 PROCEDURE — 85025 COMPLETE CBC W/AUTO DIFF WBC: CPT | Performed by: FAMILY MEDICINE

## 2024-03-12 ENCOUNTER — OFFICE VISIT (OUTPATIENT)
Dept: FAMILY MEDICINE CLINIC | Facility: CLINIC | Age: 59
End: 2024-03-12
Payer: COMMERCIAL

## 2024-03-12 VITALS
DIASTOLIC BLOOD PRESSURE: 78 MMHG | BODY MASS INDEX: 37.69 KG/M2 | SYSTOLIC BLOOD PRESSURE: 134 MMHG | OXYGEN SATURATION: 100 % | HEIGHT: 64 IN | HEART RATE: 70 BPM | WEIGHT: 220.8 LBS

## 2024-03-12 DIAGNOSIS — E78.00 PURE HYPERCHOLESTEROLEMIA: ICD-10-CM

## 2024-03-12 DIAGNOSIS — Z00.00 ROUTINE GENERAL MEDICAL EXAMINATION AT A HEALTH CARE FACILITY: Primary | ICD-10-CM

## 2024-03-12 DIAGNOSIS — Z12.11 ENCOUNTER FOR COLORECTAL CANCER SCREENING: ICD-10-CM

## 2024-03-12 DIAGNOSIS — F33.9 RECURRENT MAJOR DEPRESSIVE DISORDER, REMISSION STATUS UNSPECIFIED (HCC): ICD-10-CM

## 2024-03-12 DIAGNOSIS — E78.1 PURE HYPERGLYCERIDEMIA: ICD-10-CM

## 2024-03-12 DIAGNOSIS — I10 PRIMARY HYPERTENSION: ICD-10-CM

## 2024-03-12 DIAGNOSIS — Z12.12 ENCOUNTER FOR COLORECTAL CANCER SCREENING: ICD-10-CM

## 2024-03-12 DIAGNOSIS — E66.01 SEVERE OBESITY (BMI 35.0-39.9) WITH COMORBIDITY (HCC): ICD-10-CM

## 2024-03-12 PROCEDURE — G8484 FLU IMMUNIZE NO ADMIN: HCPCS | Performed by: FAMILY MEDICINE

## 2024-03-12 PROCEDURE — 3078F DIAST BP <80 MM HG: CPT | Performed by: FAMILY MEDICINE

## 2024-03-12 PROCEDURE — 3075F SYST BP GE 130 - 139MM HG: CPT | Performed by: FAMILY MEDICINE

## 2024-03-12 PROCEDURE — 99396 PREV VISIT EST AGE 40-64: CPT | Performed by: FAMILY MEDICINE

## 2024-03-12 RX ORDER — PANTOPRAZOLE SODIUM 40 MG/1
40 TABLET, DELAYED RELEASE ORAL DAILY
Qty: 30 TABLET | Refills: 5 | Status: SHIPPED | OUTPATIENT
Start: 2024-03-12

## 2024-03-12 RX ORDER — MELOXICAM 7.5 MG/1
7.5 TABLET ORAL DAILY
Qty: 90 TABLET | Refills: 3 | Status: SHIPPED | OUTPATIENT
Start: 2024-03-12

## 2024-03-12 RX ORDER — LISINOPRIL 10 MG/1
10 TABLET ORAL DAILY
Qty: 90 TABLET | Refills: 3 | Status: SHIPPED | OUTPATIENT
Start: 2024-03-12

## 2024-03-12 SDOH — ECONOMIC STABILITY: INCOME INSECURITY: HOW HARD IS IT FOR YOU TO PAY FOR THE VERY BASICS LIKE FOOD, HOUSING, MEDICAL CARE, AND HEATING?: NOT HARD AT ALL

## 2024-03-12 SDOH — ECONOMIC STABILITY: FOOD INSECURITY: WITHIN THE PAST 12 MONTHS, YOU WORRIED THAT YOUR FOOD WOULD RUN OUT BEFORE YOU GOT MONEY TO BUY MORE.: NEVER TRUE

## 2024-03-12 SDOH — ECONOMIC STABILITY: FOOD INSECURITY: WITHIN THE PAST 12 MONTHS, THE FOOD YOU BOUGHT JUST DIDN'T LAST AND YOU DIDN'T HAVE MONEY TO GET MORE.: NEVER TRUE

## 2024-03-12 ASSESSMENT — PATIENT HEALTH QUESTIONNAIRE - PHQ9
SUM OF ALL RESPONSES TO PHQ QUESTIONS 1-9: 0
SUM OF ALL RESPONSES TO PHQ9 QUESTIONS 1 & 2: 0
SUM OF ALL RESPONSES TO PHQ QUESTIONS 1-9: 0
1. LITTLE INTEREST OR PLEASURE IN DOING THINGS: 0
2. FEELING DOWN, DEPRESSED OR HOPELESS: 0
SUM OF ALL RESPONSES TO PHQ QUESTIONS 1-9: 0
SUM OF ALL RESPONSES TO PHQ QUESTIONS 1-9: 0

## 2024-03-12 NOTE — PROGRESS NOTES
of PPI therapy including long-term concerns.    Elements of this note have been dictated using speech recognition software. As a result, errors of speech recognition may have occurred.

## 2024-03-18 ENCOUNTER — TELEPHONE (OUTPATIENT)
Dept: GASTROENTEROLOGY | Age: 59
End: 2024-03-18

## 2024-05-20 ENCOUNTER — TELEPHONE (OUTPATIENT)
Dept: OBGYN CLINIC | Age: 59
End: 2024-05-20

## 2024-05-20 NOTE — TELEPHONE ENCOUNTER
Called and LVM for patient informing her that annual is not due until August and that she does not need to come in for appointment tomorrow, 5/21/24.

## 2024-05-21 ENCOUNTER — OFFICE VISIT (OUTPATIENT)
Dept: OBGYN CLINIC | Age: 59
End: 2024-05-21
Payer: COMMERCIAL

## 2024-05-21 VITALS
DIASTOLIC BLOOD PRESSURE: 80 MMHG | HEIGHT: 64 IN | WEIGHT: 215 LBS | BODY MASS INDEX: 36.7 KG/M2 | SYSTOLIC BLOOD PRESSURE: 120 MMHG

## 2024-05-21 DIAGNOSIS — E04.1 THYROID NODULE: ICD-10-CM

## 2024-05-21 DIAGNOSIS — Z12.4 SCREENING FOR CERVICAL CANCER: ICD-10-CM

## 2024-05-21 DIAGNOSIS — Z01.419 WELL WOMAN EXAM WITH ROUTINE GYNECOLOGICAL EXAM: Primary | ICD-10-CM

## 2024-05-21 DIAGNOSIS — Z12.31 ENCOUNTER FOR SCREENING MAMMOGRAM FOR MALIGNANT NEOPLASM OF BREAST: ICD-10-CM

## 2024-05-21 DIAGNOSIS — N95.2 VAGINAL ATROPHY: ICD-10-CM

## 2024-05-21 DIAGNOSIS — Z11.51 SCREENING FOR HUMAN PAPILLOMAVIRUS (HPV): ICD-10-CM

## 2024-05-21 DIAGNOSIS — F41.8 ANXIETY WITH DEPRESSION: ICD-10-CM

## 2024-05-21 LAB — TSH W FREE THYROID IF ABNORMAL: 1.26 UIU/ML (ref 0.27–4.2)

## 2024-05-21 PROCEDURE — 3074F SYST BP LT 130 MM HG: CPT | Performed by: OBSTETRICS & GYNECOLOGY

## 2024-05-21 PROCEDURE — 99396 PREV VISIT EST AGE 40-64: CPT | Performed by: OBSTETRICS & GYNECOLOGY

## 2024-05-21 PROCEDURE — 3079F DIAST BP 80-89 MM HG: CPT | Performed by: OBSTETRICS & GYNECOLOGY

## 2024-05-21 RX ORDER — ESTRADIOL 0.1 MG/G
CREAM VAGINAL
Qty: 42.5 G | Refills: 5 | Status: CANCELLED | OUTPATIENT
Start: 2024-05-21

## 2024-05-21 RX ORDER — VALACYCLOVIR HYDROCHLORIDE 1 G/1
1000 TABLET, FILM COATED ORAL 3 TIMES DAILY
COMMUNITY
Start: 2024-04-27

## 2024-05-21 RX ORDER — ESTRADIOL 0.1 MG/G
CREAM VAGINAL
Qty: 42.5 G | Refills: 5 | Status: SHIPPED | OUTPATIENT
Start: 2024-05-21

## 2024-05-21 NOTE — PROGRESS NOTES
Vascular   Normal    Neuropsychiatric   Examination of related systems reveals - The patient is well-nourished and well-groomed. Mental status exam performed with findings of - Oriented X3 with appropriate mood and affect.     Musculoskeletal  Normal      General Lymphatics  Normal           Medical problems and test results were reviewed with the patient today.     ASSESSMENT and PLAN    1. Well woman exam with routine gynecological exam  -     PAP IG, HPV Rfx HPV 16/18,45; Future  2. Screening for human papillomavirus (HPV)  -     PAP IG, HPV Rfx HPV 16/18,45; Future  3. Screening for cervical cancer  -     PAP IG, HPV Rfx HPV 16/18,45; Future  4. Encounter for screening mammogram for malignant neoplasm of breast  -     Y[facility] MISHA DIGITAL SCREEN BILATERAL; Future  5. Anxiety with depression  -     sertraline (ZOLOFT) 50 MG tablet; Take 1 tablet by mouth daily, Disp-90 tablet, R-4Normal  6. Vaginal atrophy  -     estradiol (ESTRACE VAGINAL) 0.1 MG/GM vaginal cream; Use 1/2 g vaginally 3 nights per week, Disp-42.5 g, R-5Normal  7. Thyroid nodule  -     TSH with Reflex; Future  -     [facility]           No follow-ups on file.       MARYLIN SKAGGS MD  5/21/2024

## 2024-05-28 LAB
COLLECTION METHOD: NORMAL
CYTOLOGIST CVX/VAG CYTO: NORMAL
CYTOLOGY CVX/VAG DOC THIN PREP: NORMAL
HPV APTIMA: NEGATIVE
Lab: NORMAL
OTHER PT INFO: NORMAL
PAP SOURCE: NORMAL
PATH REPORT.FINAL DX SPEC: NORMAL
PREV CYTO INFO: NEGATIVE
PREV TREATMENT RESULTS: NORMAL
PREV TREATMENT: NORMAL
STAT OF ADQ CVX/VAG CYTO-IMP: NORMAL

## 2024-09-03 RX ORDER — PANTOPRAZOLE SODIUM 40 MG/1
40 TABLET, DELAYED RELEASE ORAL DAILY
Qty: 90 TABLET | Refills: 1 | Status: SHIPPED | OUTPATIENT
Start: 2024-09-03

## 2024-09-09 ENCOUNTER — LAB (OUTPATIENT)
Dept: FAMILY MEDICINE CLINIC | Facility: CLINIC | Age: 59
End: 2024-09-09

## 2024-09-09 DIAGNOSIS — E78.00 PURE HYPERCHOLESTEROLEMIA: ICD-10-CM

## 2024-09-09 DIAGNOSIS — E78.1 PURE HYPERGLYCERIDEMIA: ICD-10-CM

## 2024-09-09 DIAGNOSIS — I10 PRIMARY HYPERTENSION: ICD-10-CM

## 2024-09-09 LAB
ALBUMIN SERPL-MCNC: 4 G/DL (ref 3.5–5)
ALBUMIN/GLOB SERPL: 1.4 (ref 1–1.9)
ALP SERPL-CCNC: 88 U/L (ref 35–104)
ALT SERPL-CCNC: 15 U/L (ref 12–65)
ANION GAP SERPL CALC-SCNC: 10 MMOL/L (ref 9–18)
AST SERPL-CCNC: 17 U/L (ref 15–37)
BILIRUB SERPL-MCNC: 0.3 MG/DL (ref 0–1.2)
BUN SERPL-MCNC: 27 MG/DL (ref 6–23)
CALCIUM SERPL-MCNC: 9.6 MG/DL (ref 8.8–10.2)
CHLORIDE SERPL-SCNC: 102 MMOL/L (ref 98–107)
CHOLEST SERPL-MCNC: 230 MG/DL (ref 0–200)
CO2 SERPL-SCNC: 28 MMOL/L (ref 20–28)
CREAT SERPL-MCNC: 0.62 MG/DL (ref 0.6–1.1)
GLOBULIN SER CALC-MCNC: 2.9 G/DL (ref 2.3–3.5)
GLUCOSE SERPL-MCNC: 96 MG/DL (ref 70–99)
HDLC SERPL-MCNC: 58 MG/DL (ref 40–60)
HDLC SERPL: 4 (ref 0–5)
LDLC SERPL CALC-MCNC: 141 MG/DL (ref 0–100)
POTASSIUM SERPL-SCNC: 4.9 MMOL/L (ref 3.5–5.1)
PROT SERPL-MCNC: 7 G/DL (ref 6.3–8.2)
SODIUM SERPL-SCNC: 140 MMOL/L (ref 136–145)
TRIGL SERPL-MCNC: 153 MG/DL (ref 0–150)
VLDLC SERPL CALC-MCNC: 31 MG/DL (ref 6–23)

## 2024-09-16 ENCOUNTER — OFFICE VISIT (OUTPATIENT)
Dept: FAMILY MEDICINE CLINIC | Facility: CLINIC | Age: 59
End: 2024-09-16
Payer: COMMERCIAL

## 2024-09-16 VITALS
WEIGHT: 213.2 LBS | SYSTOLIC BLOOD PRESSURE: 118 MMHG | DIASTOLIC BLOOD PRESSURE: 68 MMHG | BODY MASS INDEX: 37.78 KG/M2 | HEART RATE: 90 BPM | OXYGEN SATURATION: 98 % | HEIGHT: 63 IN

## 2024-09-16 DIAGNOSIS — E01.0 THYROMEGALY: ICD-10-CM

## 2024-09-16 DIAGNOSIS — E78.1 PURE HYPERGLYCERIDEMIA: ICD-10-CM

## 2024-09-16 DIAGNOSIS — K21.9 GERD WITHOUT ESOPHAGITIS: ICD-10-CM

## 2024-09-16 DIAGNOSIS — E66.01 SEVERE OBESITY (BMI 35.0-39.9) WITH COMORBIDITY (HCC): ICD-10-CM

## 2024-09-16 DIAGNOSIS — F33.9 RECURRENT DEPRESSION (HCC): ICD-10-CM

## 2024-09-16 DIAGNOSIS — I10 PRIMARY HYPERTENSION: ICD-10-CM

## 2024-09-16 DIAGNOSIS — E78.00 PURE HYPERCHOLESTEROLEMIA: Primary | ICD-10-CM

## 2024-09-16 PROCEDURE — 99214 OFFICE O/P EST MOD 30 MIN: CPT | Performed by: FAMILY MEDICINE

## 2024-09-16 PROCEDURE — 3074F SYST BP LT 130 MM HG: CPT | Performed by: FAMILY MEDICINE

## 2024-09-16 PROCEDURE — 3017F COLORECTAL CA SCREEN DOC REV: CPT | Performed by: FAMILY MEDICINE

## 2024-09-16 PROCEDURE — G8427 DOCREV CUR MEDS BY ELIG CLIN: HCPCS | Performed by: FAMILY MEDICINE

## 2024-09-16 PROCEDURE — 1036F TOBACCO NON-USER: CPT | Performed by: FAMILY MEDICINE

## 2024-09-16 PROCEDURE — 3078F DIAST BP <80 MM HG: CPT | Performed by: FAMILY MEDICINE

## 2024-09-16 PROCEDURE — G8417 CALC BMI ABV UP PARAM F/U: HCPCS | Performed by: FAMILY MEDICINE

## 2024-09-16 ASSESSMENT — PATIENT HEALTH QUESTIONNAIRE - PHQ9
7. TROUBLE CONCENTRATING ON THINGS, SUCH AS READING THE NEWSPAPER OR WATCHING TELEVISION: NOT AT ALL
9. THOUGHTS THAT YOU WOULD BE BETTER OFF DEAD, OR OF HURTING YOURSELF: NOT AT ALL
SUM OF ALL RESPONSES TO PHQ QUESTIONS 1-9: 0
SUM OF ALL RESPONSES TO PHQ9 QUESTIONS 1 & 2: 0
6. FEELING BAD ABOUT YOURSELF - OR THAT YOU ARE A FAILURE OR HAVE LET YOURSELF OR YOUR FAMILY DOWN: NOT AT ALL
1. LITTLE INTEREST OR PLEASURE IN DOING THINGS: NOT AT ALL
SUM OF ALL RESPONSES TO PHQ QUESTIONS 1-9: 0
5. POOR APPETITE OR OVEREATING: NOT AT ALL
3. TROUBLE FALLING OR STAYING ASLEEP: NOT AT ALL
10. IF YOU CHECKED OFF ANY PROBLEMS, HOW DIFFICULT HAVE THESE PROBLEMS MADE IT FOR YOU TO DO YOUR WORK, TAKE CARE OF THINGS AT HOME, OR GET ALONG WITH OTHER PEOPLE: NOT DIFFICULT AT ALL
2. FEELING DOWN, DEPRESSED OR HOPELESS: NOT AT ALL
8. MOVING OR SPEAKING SO SLOWLY THAT OTHER PEOPLE COULD HAVE NOTICED. OR THE OPPOSITE, BEING SO FIGETY OR RESTLESS THAT YOU HAVE BEEN MOVING AROUND A LOT MORE THAN USUAL: NOT AT ALL
4. FEELING TIRED OR HAVING LITTLE ENERGY: NOT AT ALL
SUM OF ALL RESPONSES TO PHQ QUESTIONS 1-9: 0
SUM OF ALL RESPONSES TO PHQ QUESTIONS 1-9: 0

## 2025-02-26 RX ORDER — PANTOPRAZOLE SODIUM 40 MG/1
40 TABLET, DELAYED RELEASE ORAL DAILY
Qty: 90 TABLET | Refills: 1 | Status: SHIPPED | OUTPATIENT
Start: 2025-02-26

## 2025-03-06 DIAGNOSIS — Z00.00 LABORATORY EXAM ORDERED AS PART OF ROUTINE GENERAL MEDICAL EXAMINATION: Primary | ICD-10-CM

## 2025-03-06 DIAGNOSIS — I10 PRIMARY HYPERTENSION: ICD-10-CM

## 2025-03-06 DIAGNOSIS — E78.1 PURE HYPERGLYCERIDEMIA: ICD-10-CM

## 2025-03-06 DIAGNOSIS — E78.00 PURE HYPERCHOLESTEROLEMIA: ICD-10-CM

## 2025-03-11 ENCOUNTER — LAB (OUTPATIENT)
Dept: FAMILY MEDICINE CLINIC | Facility: CLINIC | Age: 60
End: 2025-03-11
Payer: COMMERCIAL

## 2025-03-11 DIAGNOSIS — E78.00 PURE HYPERCHOLESTEROLEMIA: ICD-10-CM

## 2025-03-11 DIAGNOSIS — I10 PRIMARY HYPERTENSION: ICD-10-CM

## 2025-03-11 DIAGNOSIS — Z00.00 LABORATORY EXAM ORDERED AS PART OF ROUTINE GENERAL MEDICAL EXAMINATION: ICD-10-CM

## 2025-03-11 DIAGNOSIS — E78.1 PURE HYPERGLYCERIDEMIA: ICD-10-CM

## 2025-03-11 LAB
ALBUMIN SERPL-MCNC: 3.8 G/DL (ref 3.2–4.6)
ALBUMIN/GLOB SERPL: 1.3 (ref 1–1.9)
ALP SERPL-CCNC: 84 U/L (ref 35–104)
ALT SERPL-CCNC: 20 U/L (ref 8–45)
ANION GAP SERPL CALC-SCNC: 11 MMOL/L (ref 7–16)
AST SERPL-CCNC: 21 U/L (ref 15–37)
BASOPHILS # BLD: 0.05 K/UL (ref 0–0.2)
BASOPHILS NFR BLD: 1.1 % (ref 0–2)
BILIRUB SERPL-MCNC: 0.3 MG/DL (ref 0–1.2)
BUN SERPL-MCNC: 23 MG/DL (ref 8–23)
CALCIUM SERPL-MCNC: 9.3 MG/DL (ref 8.8–10.2)
CHLORIDE SERPL-SCNC: 102 MMOL/L (ref 98–107)
CHOLEST SERPL-MCNC: 224 MG/DL (ref 0–200)
CO2 SERPL-SCNC: 27 MMOL/L (ref 20–29)
CREAT SERPL-MCNC: 0.75 MG/DL (ref 0.6–1.1)
DIFFERENTIAL METHOD BLD: NORMAL
EOSINOPHIL # BLD: 0.07 K/UL (ref 0–0.8)
EOSINOPHIL NFR BLD: 1.5 % (ref 0.5–7.8)
ERYTHROCYTE [DISTWIDTH] IN BLOOD BY AUTOMATED COUNT: 13.2 % (ref 11.9–14.6)
GLOBULIN SER CALC-MCNC: 3 G/DL (ref 2.3–3.5)
GLUCOSE SERPL-MCNC: 92 MG/DL (ref 70–99)
HCT VFR BLD AUTO: 41.1 % (ref 35.8–46.3)
HDLC SERPL-MCNC: 58 MG/DL (ref 40–60)
HDLC SERPL: 3.9 (ref 0–5)
HGB BLD-MCNC: 13.3 G/DL (ref 11.7–15.4)
IMM GRANULOCYTES # BLD AUTO: 0.01 K/UL (ref 0–0.5)
IMM GRANULOCYTES NFR BLD AUTO: 0.2 % (ref 0–5)
LDLC SERPL CALC-MCNC: 137 MG/DL (ref 0–100)
LYMPHOCYTES # BLD: 1.4 K/UL (ref 0.5–4.6)
LYMPHOCYTES NFR BLD: 30.6 % (ref 13–44)
MCH RBC QN AUTO: 29.5 PG (ref 26.1–32.9)
MCHC RBC AUTO-ENTMCNC: 32.4 G/DL (ref 31.4–35)
MCV RBC AUTO: 91.1 FL (ref 82–102)
MONOCYTES # BLD: 0.41 K/UL (ref 0.1–1.3)
MONOCYTES NFR BLD: 9 % (ref 4–12)
NEUTS SEG # BLD: 2.64 K/UL (ref 1.7–8.2)
NEUTS SEG NFR BLD: 57.6 % (ref 43–78)
NRBC # BLD: 0 K/UL (ref 0–0.2)
PLATELET # BLD AUTO: 278 K/UL (ref 150–450)
PMV BLD AUTO: 10.6 FL (ref 9.4–12.3)
POTASSIUM SERPL-SCNC: 4.5 MMOL/L (ref 3.5–5.1)
PROT SERPL-MCNC: 6.8 G/DL (ref 6.3–8.2)
RBC # BLD AUTO: 4.51 M/UL (ref 4.05–5.2)
SODIUM SERPL-SCNC: 139 MMOL/L (ref 136–145)
TRIGL SERPL-MCNC: 148 MG/DL (ref 0–150)
TSH, 3RD GENERATION: 1.93 UIU/ML (ref 0.27–4.2)
VLDLC SERPL CALC-MCNC: 30 MG/DL (ref 6–23)
WBC # BLD AUTO: 4.6 K/UL (ref 4.3–11.1)

## 2025-03-11 PROCEDURE — 36415 COLL VENOUS BLD VENIPUNCTURE: CPT | Performed by: FAMILY MEDICINE

## 2025-03-18 ENCOUNTER — OFFICE VISIT (OUTPATIENT)
Dept: FAMILY MEDICINE CLINIC | Facility: CLINIC | Age: 60
End: 2025-03-18
Payer: COMMERCIAL

## 2025-03-18 VITALS
HEIGHT: 63 IN | WEIGHT: 211.6 LBS | BODY MASS INDEX: 37.49 KG/M2 | SYSTOLIC BLOOD PRESSURE: 130 MMHG | HEART RATE: 93 BPM | DIASTOLIC BLOOD PRESSURE: 68 MMHG | OXYGEN SATURATION: 98 %

## 2025-03-18 DIAGNOSIS — Z00.00 ROUTINE GENERAL MEDICAL EXAMINATION AT A HEALTH CARE FACILITY: Primary | ICD-10-CM

## 2025-03-18 DIAGNOSIS — I10 PRIMARY HYPERTENSION: ICD-10-CM

## 2025-03-18 DIAGNOSIS — E78.00 PURE HYPERCHOLESTEROLEMIA: ICD-10-CM

## 2025-03-18 DIAGNOSIS — E78.1 PURE HYPERGLYCERIDEMIA: ICD-10-CM

## 2025-03-18 DIAGNOSIS — J02.9 SORE THROAT: ICD-10-CM

## 2025-03-18 DIAGNOSIS — J30.9 ALLERGIC RHINITIS, UNSPECIFIED SEASONALITY, UNSPECIFIED TRIGGER: ICD-10-CM

## 2025-03-18 PROCEDURE — 3075F SYST BP GE 130 - 139MM HG: CPT | Performed by: FAMILY MEDICINE

## 2025-03-18 PROCEDURE — 3078F DIAST BP <80 MM HG: CPT | Performed by: FAMILY MEDICINE

## 2025-03-18 PROCEDURE — 99396 PREV VISIT EST AGE 40-64: CPT | Performed by: FAMILY MEDICINE

## 2025-03-18 RX ORDER — LISINOPRIL 10 MG/1
10 TABLET ORAL DAILY
Qty: 90 TABLET | Refills: 3 | Status: SHIPPED | OUTPATIENT
Start: 2025-03-18

## 2025-03-18 RX ORDER — MELOXICAM 7.5 MG/1
7.5 TABLET ORAL DAILY
Qty: 90 TABLET | Refills: 3 | Status: SHIPPED | OUTPATIENT
Start: 2025-03-18

## 2025-03-18 ASSESSMENT — PATIENT HEALTH QUESTIONNAIRE - PHQ9
2. FEELING DOWN, DEPRESSED OR HOPELESS: NOT AT ALL
SUM OF ALL RESPONSES TO PHQ QUESTIONS 1-9: 0
1. LITTLE INTEREST OR PLEASURE IN DOING THINGS: NOT AT ALL

## 2025-03-18 NOTE — PROGRESS NOTES
SUBJECTIVE:   Zeina Sawyer is a 59 y.o. female who has a past medical history significant for hypertension, high cholesterol, high triglycerides, recurrent depression, reflux and obesity.  Patient presents today for CPX.  Review of systems reveals no complaints of chest pain, shortness of breath, orthopnea or PND.  GI and  review of systems is unremarkable.  Current medications are listed in the EMR and reviewed today.  Patient does report that she has been experiencing a sore throat and some allergy symptoms for the last 2 weeks.    HPI  See above    Past Medical History, Past Surgical History, Family history, Social History, and Medications were all reviewed with the patient today and updated as necessary.       Current Outpatient Medications   Medication Sig Dispense Refill    pantoprazole (PROTONIX) 40 MG tablet TAKE 1 TABLET BY MOUTH EVERY DAY 90 tablet 1    estradiol (ESTRACE VAGINAL) 0.1 MG/GM vaginal cream Use 1/2 g vaginally 3 nights per week 42.5 g 5    sertraline (ZOLOFT) 50 MG tablet Take 1 tablet by mouth daily 90 tablet 4    lisinopril (PRINIVIL;ZESTRIL) 10 MG tablet Take 1 tablet by mouth daily 90 tablet 3    meloxicam (MOBIC) 7.5 MG tablet Take 1 tablet by mouth daily 90 tablet 3    Multiple Vitamin (MULTIVITAMIN PO) Take by mouth       No current facility-administered medications for this visit.     Allergies   Allergen Reactions    Latex Rash    Adhesive Tape Hives    Other Hives    Sulfa Antibiotics Hives     Patient Active Problem List   Diagnosis    HPV in female    Overactive bladder    SUNNY III (cervical intraepithelial neoplasia grade III) with severe dysplasia    Severe obesity (BMI 35.0-39.9) with comorbidity    Recurrent depression    Atrophic vaginitis    Essential hypertension    Hyperlipidemia    Anxiety with depression     Past Medical History:   Diagnosis Date    SUNNY III (cervical intraepithelial neoplasia grade III) with severe dysplasia     Depression     Hyperlipidemia

## 2025-03-18 NOTE — PROGRESS NOTES
\"Have you been to the ER, urgent care clinic since your last visit?  Hospitalized since your last visit?\"    YES - When: approximately 1 months ago.  Where and Why: [facility]    “Have you seen or consulted any other health care providers outside our system since your last visit?”    NO      “Have you had a colorectal cancer screening such as a colonoscopy/FIT/Cologuard?    NO    Date of last Colonoscopy: 4/9/2015  No cologuard on file  No FIT/FOBT on file   No flexible sigmoidoscopy on file

## 2025-04-08 ENCOUNTER — HOSPITAL ENCOUNTER (OUTPATIENT)
Dept: MAMMOGRAPHY | Age: 60
Discharge: HOME | End: 2025-04-11
Payer: COMMERCIAL

## 2025-04-08 VITALS — BODY MASS INDEX: 35.85 KG/M2 | WEIGHT: 210 LBS | HEIGHT: 64 IN

## 2025-04-08 DIAGNOSIS — Z12.31 ENCOUNTER FOR SCREENING MAMMOGRAM FOR MALIGNANT NEOPLASM OF BREAST: ICD-10-CM

## 2025-04-08 PROCEDURE — 77063 BREAST TOMOSYNTHESIS BI: CPT

## 2025-09-02 RX ORDER — PANTOPRAZOLE SODIUM 40 MG/1
40 TABLET, DELAYED RELEASE ORAL DAILY
Qty: 90 TABLET | Refills: 1 | Status: SHIPPED | OUTPATIENT
Start: 2025-09-02